# Patient Record
Sex: FEMALE | Race: WHITE | NOT HISPANIC OR LATINO | Employment: FULL TIME | URBAN - NONMETROPOLITAN AREA
[De-identification: names, ages, dates, MRNs, and addresses within clinical notes are randomized per-mention and may not be internally consistent; named-entity substitution may affect disease eponyms.]

---

## 2023-11-10 ENCOUNTER — OFFICE VISIT (OUTPATIENT)
Dept: PRIMARY CARE | Facility: CLINIC | Age: 58
End: 2023-11-10
Payer: COMMERCIAL

## 2023-11-10 ENCOUNTER — HOSPITAL ENCOUNTER (OUTPATIENT)
Dept: RADIOLOGY | Facility: HOSPITAL | Age: 58
Discharge: HOME | End: 2023-11-10
Payer: COMMERCIAL

## 2023-11-10 VITALS
SYSTOLIC BLOOD PRESSURE: 118 MMHG | HEART RATE: 72 BPM | OXYGEN SATURATION: 98 % | DIASTOLIC BLOOD PRESSURE: 78 MMHG | BODY MASS INDEX: 32.28 KG/M2 | WEIGHT: 159.8 LBS

## 2023-11-10 DIAGNOSIS — M25.561 ACUTE PAIN OF RIGHT KNEE: Primary | ICD-10-CM

## 2023-11-10 DIAGNOSIS — S86.911A KNEE STRAIN, RIGHT, INITIAL ENCOUNTER: ICD-10-CM

## 2023-11-10 DIAGNOSIS — M25.561 ACUTE PAIN OF RIGHT KNEE: ICD-10-CM

## 2023-11-10 PROBLEM — N90.89 VULVAR LESION: Status: ACTIVE | Noted: 2023-11-10

## 2023-11-10 PROBLEM — Z04.9 CONDITION NOT FOUND: Status: ACTIVE | Noted: 2023-11-10

## 2023-11-10 PROCEDURE — 73562 X-RAY EXAM OF KNEE 3: CPT | Mod: RT

## 2023-11-10 PROCEDURE — 2500000004 HC RX 250 GENERAL PHARMACY W/ HCPCS (ALT 636 FOR OP/ED): Performed by: FAMILY MEDICINE

## 2023-11-10 PROCEDURE — 73562 X-RAY EXAM OF KNEE 3: CPT | Mod: RIGHT SIDE | Performed by: RADIOLOGY

## 2023-11-10 PROCEDURE — 99213 OFFICE O/P EST LOW 20 MIN: CPT | Performed by: FAMILY MEDICINE

## 2023-11-10 PROCEDURE — 96372 THER/PROPH/DIAG INJ SC/IM: CPT | Performed by: FAMILY MEDICINE

## 2023-11-10 PROCEDURE — 1036F TOBACCO NON-USER: CPT | Performed by: FAMILY MEDICINE

## 2023-11-10 RX ORDER — METHYLPREDNISOLONE 4 MG/1
TABLET ORAL
Qty: 1 EACH | Refills: 0 | Status: SHIPPED | OUTPATIENT
Start: 2023-11-11 | End: 2024-01-23 | Stop reason: WASHOUT

## 2023-11-10 RX ORDER — METHYLPREDNISOLONE ACETATE 80 MG/ML
80 INJECTION, SUSPENSION INTRA-ARTICULAR; INTRALESIONAL; INTRAMUSCULAR; SOFT TISSUE ONCE
Status: COMPLETED | OUTPATIENT
Start: 2023-11-10 | End: 2023-11-10

## 2023-11-10 RX ADMIN — METHYLPREDNISOLONE ACETATE 80 MG: 80 INJECTION, SUSPENSION INTRA-ARTICULAR; INTRALESIONAL; INTRAMUSCULAR; SOFT TISSUE at 15:55

## 2023-11-10 ASSESSMENT — ENCOUNTER SYMPTOMS
ARTHRALGIAS: 1
WEAKNESS: 0
JOINT SWELLING: 1

## 2023-11-10 ASSESSMENT — PATIENT HEALTH QUESTIONNAIRE - PHQ9
2. FEELING DOWN, DEPRESSED OR HOPELESS: NOT AT ALL
SUM OF ALL RESPONSES TO PHQ9 QUESTIONS 1 AND 2: 0
1. LITTLE INTEREST OR PLEASURE IN DOING THINGS: NOT AT ALL

## 2023-11-10 ASSESSMENT — PAIN SCALES - GENERAL: PAINLEVEL: 8

## 2023-11-10 NOTE — PROGRESS NOTES
Subjective   Patient ID: Risa Alvarenga is a 58 y.o. female who presents for right leg pain.    HPI pt c.o of pain behind right knee.  She has been doing alot of painting and climbing up and down the ladder and noticed a little discomfort at the knee end of August/Sept.  Since then it has worsened and she thinks it may look swollen.  It is localized to back and lateral part of right knee.  Using aspercream which helps. Pain is worse at end of day after standing all day.  It hurts at night in bed.  Pain is relieved by nothing.    Review of Systems   Musculoskeletal:  Positive for arthralgias, gait problem and joint swelling.   Neurological:  Negative for weakness.       Objective   /78   Pulse 72   Wt 72.5 kg (159 lb 12.8 oz)   SpO2 98%   BMI 32.28 kg/m²     Physical Exam  Musculoskeletal:      Right knee: Ecchymosis present. No swelling, effusion, erythema or crepitus. Tenderness present over the lateral joint line and LCL. No LCL laxity, MCL laxity, ACL laxity or PCL laxity. Normal meniscus.      Instability Tests: Anterior drawer test negative. Anterior Lachman test negative. Medial Carlos test negative and lateral Carlos test negative.         Assessment/Plan   Problem List Items Addressed This Visit    None  Visit Diagnoses         Codes    Acute pain of right knee    -  Primary M25.561    Relevant Medications    methylPREDNISolone (Medrol Dospak) 4 mg tablets (Start on 11/11/2023)    methylPREDNISolone acetate (DEPO-Medrol) injection 80 mg (Completed) (Start on 11/10/2023  4:00 PM)    Other Relevant Orders    XR knee right 1-2 views    Knee strain, right, initial encounter     S86.911A    rest, ice, anti inflammatories

## 2023-12-28 ENCOUNTER — OFFICE VISIT (OUTPATIENT)
Dept: OBSTETRICS AND GYNECOLOGY | Facility: CLINIC | Age: 58
End: 2023-12-28
Payer: COMMERCIAL

## 2023-12-28 VITALS
DIASTOLIC BLOOD PRESSURE: 68 MMHG | HEIGHT: 60 IN | SYSTOLIC BLOOD PRESSURE: 108 MMHG | WEIGHT: 158 LBS | BODY MASS INDEX: 31.02 KG/M2

## 2023-12-28 DIAGNOSIS — Z12.4 CERVICAL CANCER SCREENING: ICD-10-CM

## 2023-12-28 DIAGNOSIS — Z01.419 WELL WOMAN EXAM WITH ROUTINE GYNECOLOGICAL EXAM: Primary | ICD-10-CM

## 2023-12-28 DIAGNOSIS — Z12.31 VISIT FOR SCREENING MAMMOGRAM: ICD-10-CM

## 2023-12-28 PROCEDURE — 99396 PREV VISIT EST AGE 40-64: CPT | Performed by: NURSE PRACTITIONER

## 2023-12-28 PROCEDURE — 87624 HPV HI-RISK TYP POOLED RSLT: CPT

## 2023-12-28 PROCEDURE — 88175 CYTOPATH C/V AUTO FLUID REDO: CPT

## 2023-12-28 PROCEDURE — 1036F TOBACCO NON-USER: CPT | Performed by: NURSE PRACTITIONER

## 2023-12-28 PROCEDURE — 88141 CYTOPATH C/V INTERPRET: CPT | Performed by: PATHOLOGY

## 2023-12-28 ASSESSMENT — ENCOUNTER SYMPTOMS
NEUROLOGICAL NEGATIVE: 1
ENDOCRINE NEGATIVE: 1
MUSCULOSKELETAL NEGATIVE: 1
ALLERGIC/IMMUNOLOGIC NEGATIVE: 1
EYES NEGATIVE: 1
ABDOMINAL PAIN: 1
PSYCHIATRIC NEGATIVE: 1
HEMATOLOGIC/LYMPHATIC NEGATIVE: 1
RESPIRATORY NEGATIVE: 1
CONSTITUTIONAL NEGATIVE: 1
CARDIOVASCULAR NEGATIVE: 1

## 2023-12-28 NOTE — PROGRESS NOTES
"Chief Complaint    Annual Exam        HPI    ANNUAL - WAKES UP CRAMPY AND STOMACH PAIN    PAP - 10/23/2020 NEG / HPV NEG  MAMMO - 2022  DEXA - NEVER  COLON -  OR  NORMAL  Last edited by Corie Mahmood MA on 2023  9:43 AM.         58 y.o.  female presents for annual well woman exam.   Hx. of endometrial ablation in ~.  Denies menopausal symptoms. She is not on hormone replacement therapy.  Denies any post-menopausal vaginal bleeding.  She is , currently sexually active. Denies dyspareunia.    She denies any breast changes. H/O benign fatty lymph nodes, right axilla.  Pap hx. normal. Last pap: 10/2020 and was negative and negative HPV.   Denies abnormal vaginal discharge, itching, odor, or dryness.   No urinary or bowel concerns. H/O bladder suspension ~20 years ago. Reports abdominal pain, near umbilicus and upset stomach. Occurs in the morning then gets better throughout the day. Colonoscopy: 2019 which was ~2019 and is due to return at 5 years.   She is a non-smoker.   PMH: None  Family h/o breast cancer: Mother  Family h/o GYN cancer: None  Family h/o colon cancer: None  She has 2 sons. First grandbaby born this year. Works as manager in cafeteria at [employer]    /68   Ht 1.518 m (4' 11.75\")   Wt 71.7 kg (158 lb)   BMI 31.12 kg/m²      Review of Systems   Constitutional: Negative.    HENT: Negative.     Eyes: Negative.    Respiratory: Negative.     Cardiovascular: Negative.    Gastrointestinal:  Positive for abdominal pain.   Endocrine: Negative.    Genitourinary: Negative.    Musculoskeletal: Negative.    Skin: Negative.    Allergic/Immunologic: Negative.    Neurological: Negative.    Hematological: Negative.    Psychiatric/Behavioral: Negative.     All other systems reviewed and are negative.       Physical Exam  Constitutional:       Appearance: Normal appearance.   HENT:      Head: Normocephalic.      Nose: Nose normal.   Cardiovascular:      Rate and Rhythm: " Normal rate and regular rhythm.   Pulmonary:      Effort: Pulmonary effort is normal.      Breath sounds: Normal breath sounds.   Chest:   Breasts:     Right: Normal.      Left: Normal.   Abdominal:      General: Abdomen is flat.      Palpations: Abdomen is soft.   Genitourinary:     General: Normal vulva.      Vagina: Normal.      Cervix: Normal.      Uterus: Normal.       Adnexa: Right adnexa normal and left adnexa normal.      Rectum: Normal.      Comments: Pap collected today  Musculoskeletal:         General: Normal range of motion.      Cervical back: Normal range of motion and neck supple.   Skin:     General: Skin is warm and dry.   Neurological:      Mental Status: She is alert.   Psychiatric:         Mood and Affect: Mood normal.         Behavior: Behavior normal.     1. Well woman exam with routine gynecological exam  -Pap test w/HPV testing collected today.   -Mammogram ordered. Self breast awareness exams reviewed.  -Colonoscopy is up to date. Advised if abdominal pain continues, worsens to follow up with PCP.   -Advised yearly well woman exams.   -Follow up sooner if needed.     2. Visit for screening mammogram  - BI mammo bilateral screening tomosynthesis; Future    3. Cervical cancer screening  - THINPREP PAP

## 2024-01-22 ENCOUNTER — TELEPHONE (OUTPATIENT)
Dept: OBSTETRICS AND GYNECOLOGY | Facility: CLINIC | Age: 59
End: 2024-01-22
Payer: COMMERCIAL

## 2024-01-23 ENCOUNTER — OFFICE VISIT (OUTPATIENT)
Dept: PRIMARY CARE | Facility: CLINIC | Age: 59
End: 2024-01-23
Payer: COMMERCIAL

## 2024-01-23 ENCOUNTER — TELEPHONE (OUTPATIENT)
Dept: PRIMARY CARE | Facility: CLINIC | Age: 59
End: 2024-01-23
Payer: COMMERCIAL

## 2024-01-23 VITALS
BODY MASS INDEX: 31.79 KG/M2 | SYSTOLIC BLOOD PRESSURE: 128 MMHG | DIASTOLIC BLOOD PRESSURE: 84 MMHG | HEART RATE: 72 BPM | WEIGHT: 161.4 LBS

## 2024-01-23 DIAGNOSIS — H10.33 ACUTE CONJUNCTIVITIS OF BOTH EYES, UNSPECIFIED ACUTE CONJUNCTIVITIS TYPE: Primary | ICD-10-CM

## 2024-01-23 PROCEDURE — 1036F TOBACCO NON-USER: CPT | Performed by: FAMILY MEDICINE

## 2024-01-23 PROCEDURE — 99213 OFFICE O/P EST LOW 20 MIN: CPT | Performed by: FAMILY MEDICINE

## 2024-01-23 RX ORDER — TOBRAMYCIN 3 MG/ML
1 SOLUTION/ DROPS OPHTHALMIC 3 TIMES DAILY
Qty: 5 ML | Refills: 0 | Status: SHIPPED | OUTPATIENT
Start: 2024-01-23

## 2024-01-23 RX ORDER — GENTAMICIN SULFATE 3 MG/ML
SOLUTION/ DROPS OPHTHALMIC
COMMUNITY
Start: 2024-01-19 | End: 2024-01-23 | Stop reason: ALTCHOICE

## 2024-01-23 ASSESSMENT — PAIN SCALES - GENERAL: PAINLEVEL: 0-NO PAIN

## 2024-01-23 ASSESSMENT — PATIENT HEALTH QUESTIONNAIRE - PHQ9
1. LITTLE INTEREST OR PLEASURE IN DOING THINGS: NOT AT ALL
2. FEELING DOWN, DEPRESSED OR HOPELESS: NOT AT ALL
SUM OF ALL RESPONSES TO PHQ9 QUESTIONS 1 AND 2: 0

## 2024-01-23 NOTE — TELEPHONE ENCOUNTER
Pt called. Questions answered.   Pap showed ASCUS and was negative for HPV. Discussed this is benign.   Umbilical pain is unrelated to this finding on the pap, patient advised to follow up with PCP regarding abdominal pain.

## 2024-01-23 NOTE — PROGRESS NOTES
Subjective   Patient ID: Risa Alvarenga is a 58 y.o. female who presents for eyes itchy.    HPI   Pt comes in with bilateral red eyes with itching and discharge. Started on 1/16/24. She was seen in UC on the 1/16/24 for this and dx'd with conjunctivitis and put on gentamicin eye drops but didn't start until Friday. She says her eyes were getting better but today they had more yellow discharge so she got concerned.    Past Medical History:   Diagnosis Date    Personal history of other infectious and parasitic diseases     History of chicken pox    Plantar fascial fibromatosis     Plantar fasciitis    Pure hypercholesterolemia, unspecified     High cholesterol    Unspecified injury of unspecified foot, initial encounter     Injury of foot, unspecified laterality, initial encounter     Current Outpatient Medications   Medication Sig Dispense Refill    folic acid/multivit,iron, (ONE DAILY FOR WOMEN ORAL) Orally      tobramycin (Tobrex) 0.3 % ophthalmic solution Administer 1 drop into both eyes 3 times a day. For 7 days. 5 mL 0     No current facility-administered medications for this visit.       Review of Systems see hpi    Objective   /84   Pulse 72   Wt 73.2 kg (161 lb 6.4 oz)   BMI 31.79 kg/m²     Physical Exam  Vitals reviewed.   Constitutional:       Appearance: Normal appearance.   HENT:      Head: Normocephalic and atraumatic.   Eyes:      Extraocular Movements: Extraocular movements intact.      Pupils: Pupils are equal, round, and reactive to light.      Comments: Both eyes slightly injected; no discharge noted at this time but pt says they had yellow crusting this AM which was worse than before.   Neurological:      Mental Status: She is alert.         Assessment/Plan   Problem List Items Addressed This Visit    None  Visit Diagnoses         Codes    Acute conjunctivitis of both eyes, unspecified acute conjunctivitis type    -  Primary H10.33    Relevant Medications    tobramycin (Tobrex) 0.3 %  ophthalmic solution          Patient counseled regarding potential side-effects of new medication; there is no barrier to adherence identified and pt expressed understanding

## 2024-01-23 NOTE — TELEPHONE ENCOUNTER
Last OV Gentamicin 0.3% 2-3 drops per eye every 4 hours and every night at bedtime for 5 days- went to [facility] on 01/16/24 and she states that he eyes are worse-red, goopy, more itchy with using the drops, eye looks a little yellowish. She thinks maybe she is having a reaction-patient was sent to the front to schedule an appt

## 2024-02-06 ENCOUNTER — TELEPHONE (OUTPATIENT)
Dept: PRIMARY CARE | Facility: CLINIC | Age: 59
End: 2024-02-06
Payer: COMMERCIAL

## 2024-02-06 NOTE — TELEPHONE ENCOUNTER
Pt has sores on lips, bottom lip is tingling and burning, chest is on fire, nose is burning. Sx started yesterday morning.   Pt is covid neg.  Pt is not taking anything.

## 2024-02-06 NOTE — TELEPHONE ENCOUNTER
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 She did say in the past she was given a med for these blisters from dermatology- [facility]

## 2024-02-07 DIAGNOSIS — B97.89 VIRAL STOMATITIS: Primary | ICD-10-CM

## 2024-02-07 DIAGNOSIS — K12.1 VIRAL STOMATITIS: Primary | ICD-10-CM

## 2024-02-07 RX ORDER — LIDOCAINE HYDROCHLORIDE 20 MG/ML
SOLUTION OROPHARYNGEAL
Qty: 100 ML | Refills: 0 | Status: SHIPPED | OUTPATIENT
Start: 2024-02-07

## 2024-02-14 ENCOUNTER — HOSPITAL ENCOUNTER (OUTPATIENT)
Dept: RADIOLOGY | Facility: HOSPITAL | Age: 59
Discharge: HOME | End: 2024-02-14
Payer: COMMERCIAL

## 2024-02-14 DIAGNOSIS — G89.29 OTHER CHRONIC PAIN: ICD-10-CM

## 2024-02-14 DIAGNOSIS — M79.672 PAIN IN LEFT FOOT: ICD-10-CM

## 2024-02-14 DIAGNOSIS — M25.572 PAIN IN LEFT ANKLE AND JOINTS OF LEFT FOOT: ICD-10-CM

## 2024-02-14 PROCEDURE — 73718 MRI LOWER EXTREMITY W/O DYE: CPT | Mod: LT

## 2024-02-14 PROCEDURE — 73718 MRI LOWER EXTREMITY W/O DYE: CPT | Mod: LEFT SIDE | Performed by: STUDENT IN AN ORGANIZED HEALTH CARE EDUCATION/TRAINING PROGRAM

## 2024-02-14 PROCEDURE — 73721 MRI JNT OF LWR EXTRE W/O DYE: CPT | Mod: LT

## 2024-02-14 PROCEDURE — 73721 MRI JNT OF LWR EXTRE W/O DYE: CPT | Mod: LEFT SIDE | Performed by: STUDENT IN AN ORGANIZED HEALTH CARE EDUCATION/TRAINING PROGRAM

## 2024-08-03 NOTE — PROGRESS NOTES
Pre-Op Evaluation.  HPI:     General Comments:          Surgical procedure  LOW risk-cataract, endoscopy,breast, superficial):         History of Illness:. She's had chronic pain on right foot.  Plan is to do a weil osteotomy, left 2nd MT soft tissue reconstruction of angular toe deformity, possible neuroma excision of the left foot.  Surgery to take place 24 at [facility] by Dr. Theo Aaron of [facility].    MEDICAL HISTORY:   Past Medical History:   Diagnosis Date    Personal history of other infectious and parasitic diseases     History of chicken pox    Plantar fascial fibromatosis     Plantar fasciitis    Unspecified injury of unspecified foot, initial encounter     Injury of foot, unspecified laterality, initial encounter     MEDICATIONS:   Current Outpatient Medications   Medication Sig Dispense Refill    folic acid/multivit,iron, (ONE DAILY FOR WOMEN ORAL) Orally      valACYclovir (Valtrex) 1 gram tablet Take 2 tablets (2,000 mg) by mouth 2 times a day for 1 day. Take 2 tabs (2000 mg) 2 times a days for 1 day. 4 tablet 2     No current facility-administered medications for this visit.     ALLERGIES:   Allergies   Allergen Reactions    Penicillins Hives, Other and Rash    Sulfa (Sulfonamide Antibiotics) Rash and Hives     SURGICAL HISTORY:   Past Surgical History:   Procedure Laterality Date    BLADDER SURGERY  2004    CARDIAC SURGERY  2016    Heart Surgery     SECTION, CLASSIC       and     COLONOSCOPY  2014    sai 5 yrs  [facility] + polyp    ENDOMETRIAL ABLATION  2010    FOOT SURGERY Right 2016    [facility] for plantar fasciitis    HERNIA REPAIR  2020    [facility]; incarcerated    HYSTEROSCOPY  2015    Hysteroscopy With Endometrial Ablation    TUBAL LIGATION  1998    WISDOM TOOTH EXTRACTION       FAMILY HISTORY:   Family History   Problem Relation Name Age of Onset    Cancer Mother          metastatic; no known primary    Diabetes  Father       SOCIAL HISTORY:   Social History     Tobacco Use    Smoking status: Never     Passive exposure: Past    Smokeless tobacco: Never   Substance Use Topics    Alcohol use: Yes     Comment: SOCIALLY    Drug use: Never                 ROS:        CONSTITUTION:  alert, oriented, well developed, in no acute distress     OPHTHALMOLOGY:          Blurring of vision no.  no Vision loss.  no Eye redness.  no Double vision.         CARDIOLOGY:          no Change in exercise tolerance.  no Chest pain.  no Leg edema.  no Palpitations.         RESPIRATORY:          no Shortness of breath.  no Wheezing.  no Trouble breathing.         GASTROENTEROLOGY:          no Abdominal pain.  no Constipation.  no Diarrhea.  no Nausea.  no Vomiting.         UROLOGY:          no Blood in urine.  no Urinary incontinence.  no Urinary frequency.  no Burning on urination.         DERMATOLOGY:          no Rash.  no Jaundice.         NEUROLOGY:          no Dizziness.  no Headache.             VITAL SIGNS: /68   Pulse 83   Wt 72.7 kg (160 lb 3.2 oz)   SpO2 98%   BMI 31.55 kg/m²     EXAMINATION:       General Examination:         GENERAL APPEARANCE: NAD, pleasant.          HEENT: PERRLA, EOMI, tympanic membranes normal, pharynx and tonsils normal.          NECK: supple, no lymphadenopathy, no thyromegaly.          HEART: RSR/RRR, no murmurs, click or rubs.          LUNGS: clear to auscultation, no wheezes/rhonchi/rales.          ABDOMEN: soft, NT/ND, BS present, no hepatosplenomegaly, no CVA tenderness.          EXTREMITIES: no clubbing, no edema, no cyanosis; strength normal in all extremities bilaterally.          SKIN: normal, no rash. Small cold sore on lower lip left side; started y'day         NEUROLOGIC EXAM: no focal signs, awake & alert, oriented x 3, CN's II-XII grossly intact, reflexes normal.      Risa was seen today for surgery clearance .  Diagnoses and all orders for this visit:  Preoperative clearance  (Primary)  Comments:  revised cardiac index Class I 3.9% risk  30 day risk of death, MI or cardiac arrest.  PT IS CLEARED FOR SURGERY  Injury of left foot, initial encounter  Toe contracture, left  Left foot pain  Right foot pain  Cold sore  -     valACYclovir (Valtrex) 1 gram tablet; Take 2 tablets (2,000 mg) by mouth 2 times a day for 1 day. Take 2 tabs (2000 mg) 2 times a days for 1 day.

## 2024-08-13 ENCOUNTER — OFFICE VISIT (OUTPATIENT)
Dept: PRIMARY CARE | Facility: CLINIC | Age: 59
End: 2024-08-13
Payer: COMMERCIAL

## 2024-08-13 VITALS
HEART RATE: 83 BPM | OXYGEN SATURATION: 98 % | WEIGHT: 160.2 LBS | DIASTOLIC BLOOD PRESSURE: 68 MMHG | BODY MASS INDEX: 31.55 KG/M2 | SYSTOLIC BLOOD PRESSURE: 122 MMHG

## 2024-08-13 DIAGNOSIS — B00.1 COLD SORE: ICD-10-CM

## 2024-08-13 DIAGNOSIS — Z01.818 PREOPERATIVE CLEARANCE: Primary | ICD-10-CM

## 2024-08-13 DIAGNOSIS — M79.671 RIGHT FOOT PAIN: ICD-10-CM

## 2024-08-13 DIAGNOSIS — S99.922A INJURY OF LEFT FOOT, INITIAL ENCOUNTER: ICD-10-CM

## 2024-08-13 DIAGNOSIS — M79.672 LEFT FOOT PAIN: ICD-10-CM

## 2024-08-13 DIAGNOSIS — M20.5X2 TOE CONTRACTURE, LEFT: ICD-10-CM

## 2024-08-13 PROCEDURE — 1036F TOBACCO NON-USER: CPT | Performed by: FAMILY MEDICINE

## 2024-08-13 PROCEDURE — 99214 OFFICE O/P EST MOD 30 MIN: CPT | Performed by: FAMILY MEDICINE

## 2024-08-13 RX ORDER — VALACYCLOVIR HYDROCHLORIDE 1 G/1
2000 TABLET, FILM COATED ORAL 2 TIMES DAILY
Qty: 4 TABLET | Refills: 2 | Status: SHIPPED | OUTPATIENT
Start: 2024-08-13 | End: 2024-08-14

## 2024-08-13 ASSESSMENT — PAIN SCALES - GENERAL: PAINLEVEL: 0-NO PAIN

## 2024-08-30 ENCOUNTER — HOSPITAL ENCOUNTER (OUTPATIENT)
Dept: RADIOLOGY | Facility: HOSPITAL | Age: 59
Discharge: HOME | End: 2024-08-30
Payer: COMMERCIAL

## 2024-08-30 DIAGNOSIS — M79.662 PAIN IN LEFT LOWER LEG: ICD-10-CM

## 2024-08-30 PROCEDURE — 93971 EXTREMITY STUDY: CPT

## 2024-10-01 NOTE — PROGRESS NOTES
Subjective   Patient ID: Risa Alvarenga is a 59 y.o. female who presents for Colonoscopy Consult.  HPI  Patient returns to clinic for Colonoscopy Consult.  Patient last seen in the office on 2020 for S/P Open Repair of Incarcerated Umbilical Hernia with Mesh on 2020.  Patient's last Colonoscopy performed on 2019, recommended repeat Colonoscopy in 5 years d/t personal history of Polyps.  Patient having normal daily bowel movements without diarrhea or constipation.  No blood in the stool.  Patient's weight has been stable.  No abdominal pain.  No new cancer diagnosis in the family.    Social History:  Tobacco Use - NEVER  Do you use any recreational drugs - NEVER  Alcohol Use - SOCIALLY   Caffeine Intake - NO  Working - FULL TIME   Marital status -    Living with - SPOUSE     Past Medical History:   Diagnosis Date    Personal history of other infectious and parasitic diseases     History of chicken pox    Plantar fascial fibromatosis     Plantar fasciitis    Unspecified injury of unspecified foot, initial encounter     Injury of foot, unspecified laterality, initial encounter     Past Surgical History:   Procedure Laterality Date    BLADDER SURGERY      CARDIAC SURGERY  2016    Heart Surgery     SECTION, CLASSIC       and     COLONOSCOPY  2014    sai 5 yrs  [facility]ie + polyp    ENDOMETRIAL ABLATION  2010    FOOT SURGERY Right 2016    [facility] for plantar fasciitis    HERNIA REPAIR      [facility]; incarcerated    HYSTEROSCOPY  2015    Hysteroscopy With Endometrial Ablation    OSTEOTOMY Left 2024    FOOT- [facility]    TUBAL LIGATION  1998    WISDOM TOOTH EXTRACTION       Family History   Problem Relation Name Age of Onset    Cancer Mother          metastatic; no known primary    Diabetes Father       Allergies   Allergen Reactions    Penicillins Hives, Other and Rash     Other Reaction(s): Other    Sulfa (Sulfonamide Antibiotics) Hives and Rash       Review  "of Systems  /86   Pulse 74   Temp 36.4 °C (97.6 °F)   Resp 17   Ht 1.499 m (4' 11\")   Wt 72.6 kg (160 lb)   SpO2 96%   BMI 32.32 kg/m²     Objective   Physical Exam  /86   Pulse 74   Temp 36.4 °C (97.6 °F)   Resp 17   Ht 1.499 m (4' 11\")   Wt 72.6 kg (160 lb)   SpO2 96%   BMI 32.32 kg/m²    GENERAL APPEARANCE: Patient appears in no acute distress.   EYES: Sclera non-icteric, PERRLA.   ENT Normal appearance of ears and nose.   NECK/THYROID: Neck: no masses. Thyroid: no masses.   LYMPH NODES: No cervical or supraclavicular lymphadenopathy.   CARDIOVASCULAR Heart: RRR, no murmurs; Carotid bruits: none; Peripheral edema: none.   RESPIRATORY: Lungs: clear to auscultation bilaterally; no respiratory distress.   GI (ABDOMEN) No intraabdominal mass appreciated, no hepatosplenomegaly; Hernia: none umbilical hernia repair intact. ; Tenderness: none.   PSYCH: Patient oriented to time, place and person, normal affect.    Assessment/Plan   Problem List Items Addressed This Visit             ICD-10-CM    Personal history of colon polyps, unspecified - Primary Z86.0100     Patient due for colonoscopy.  Recommend repeat colonoscopy.  Risk-benefit alternatives of doing the procedure were thoroughly discussed with the patient agrees to proceed.  This includes a 0.02% risk of perforating the colon with polypectomies.  The RN will go over the bowel prep when scheduling.                  Velvet Ramsey MA 10/07/24 1:50 PM   "

## 2024-10-05 PROBLEM — Z86.0100 PERSONAL HISTORY OF COLON POLYPS, UNSPECIFIED: Status: ACTIVE | Noted: 2024-10-05

## 2024-10-05 NOTE — ASSESSMENT & PLAN NOTE
Patient due for colonoscopy.  Recommend repeat colonoscopy.  Risk-benefit alternatives of doing the procedure were thoroughly discussed with the patient agrees to proceed.  This includes a 0.02% risk of perforating the colon with polypectomies.  The RN will go over the bowel prep when scheduling.

## 2024-10-07 ENCOUNTER — OFFICE VISIT (OUTPATIENT)
Dept: SURGERY | Facility: CLINIC | Age: 59
End: 2024-10-07
Payer: COMMERCIAL

## 2024-10-07 VITALS
OXYGEN SATURATION: 96 % | DIASTOLIC BLOOD PRESSURE: 86 MMHG | RESPIRATION RATE: 17 BRPM | BODY MASS INDEX: 32.25 KG/M2 | TEMPERATURE: 97.6 F | SYSTOLIC BLOOD PRESSURE: 134 MMHG | HEART RATE: 74 BPM | WEIGHT: 160 LBS | HEIGHT: 59 IN

## 2024-10-07 DIAGNOSIS — Z86.0100 PERSONAL HISTORY OF COLON POLYPS, UNSPECIFIED: Primary | ICD-10-CM

## 2024-10-07 PROCEDURE — 99204 OFFICE O/P NEW MOD 45 MIN: CPT | Performed by: SURGERY

## 2024-10-07 PROCEDURE — 3008F BODY MASS INDEX DOCD: CPT | Performed by: SURGERY

## 2024-10-07 PROCEDURE — 99214 OFFICE O/P EST MOD 30 MIN: CPT | Performed by: SURGERY

## 2024-10-07 PROCEDURE — 1036F TOBACCO NON-USER: CPT | Performed by: SURGERY

## 2024-10-07 ASSESSMENT — PATIENT HEALTH QUESTIONNAIRE - PHQ9
SUM OF ALL RESPONSES TO PHQ9 QUESTIONS 1 & 2: 0
2. FEELING DOWN, DEPRESSED OR HOPELESS: NOT AT ALL
1. LITTLE INTEREST OR PLEASURE IN DOING THINGS: NOT AT ALL

## 2024-10-07 ASSESSMENT — PAIN SCALES - GENERAL: PAINLEVEL: 6

## 2024-10-07 ASSESSMENT — COLUMBIA-SUICIDE SEVERITY RATING SCALE - C-SSRS
1. IN THE PAST MONTH, HAVE YOU WISHED YOU WERE DEAD OR WISHED YOU COULD GO TO SLEEP AND NOT WAKE UP?: NO
6. HAVE YOU EVER DONE ANYTHING, STARTED TO DO ANYTHING, OR PREPARED TO DO ANYTHING TO END YOUR LIFE?: NO
2. HAVE YOU ACTUALLY HAD ANY THOUGHTS OF KILLING YOURSELF?: NO

## 2024-10-07 ASSESSMENT — LIFESTYLE VARIABLES
HOW OFTEN DO YOU HAVE A DRINK CONTAINING ALCOHOL: 2-4 TIMES A MONTH
HOW MANY STANDARD DRINKS CONTAINING ALCOHOL DO YOU HAVE ON A TYPICAL DAY: 1 OR 2
SKIP TO QUESTIONS 9-10: 1
HOW OFTEN DO YOU HAVE SIX OR MORE DRINKS ON ONE OCCASION: NEVER
AUDIT-C TOTAL SCORE: 2

## 2024-10-07 ASSESSMENT — ENCOUNTER SYMPTOMS
OCCASIONAL FEELINGS OF UNSTEADINESS: 0
DEPRESSION: 0
LOSS OF SENSATION IN FEET: 0

## 2024-10-15 ENCOUNTER — PRE-ADMISSION TESTING (OUTPATIENT)
Dept: PREADMISSION TESTING | Facility: HOSPITAL | Age: 59
End: 2024-10-15
Payer: COMMERCIAL

## 2024-10-15 VITALS
HEART RATE: 88 BPM | TEMPERATURE: 98.1 F | BODY MASS INDEX: 33.26 KG/M2 | DIASTOLIC BLOOD PRESSURE: 89 MMHG | OXYGEN SATURATION: 97 % | RESPIRATION RATE: 16 BRPM | WEIGHT: 165 LBS | HEIGHT: 59 IN | SYSTOLIC BLOOD PRESSURE: 126 MMHG

## 2024-10-15 PROCEDURE — 99204 OFFICE O/P NEW MOD 45 MIN: CPT | Performed by: NURSE PRACTITIONER

## 2024-10-15 ASSESSMENT — ENCOUNTER SYMPTOMS
RESPIRATORY NEGATIVE: 1
MUSCULOSKELETAL NEGATIVE: 1
GASTROINTESTINAL NEGATIVE: 1
CARDIOVASCULAR NEGATIVE: 1
EYES NEGATIVE: 1
PSYCHIATRIC NEGATIVE: 1
CONSTITUTIONAL NEGATIVE: 1
NEUROLOGICAL NEGATIVE: 1

## 2024-10-15 ASSESSMENT — DUKE ACTIVITY SCORE INDEX (DASI)
CAN YOU TAKE CARE OF YOURSELF (EAT, DRESS, BATHE, OR USE TOILET): YES
CAN YOU WALK A BLOCK OR TWO ON LEVEL GROUND: NO
CAN YOU CLIMB A FLIGHT OF STAIRS OR WALK UP A HILL: YES
CAN YOU DO YARD WORK LIKE RAKING LEAVES, WEEDING OR PUSHING A MOWER: YES
CAN YOU RUN A SHORT DISTANCE: NO
CAN YOU DO HEAVY WORK AROUND THE HOUSE LIKE SCRUBBING FLOORS OR LIFTING AND MOVING HEAVY FURNITURE: YES
TOTAL_SCORE: 39.95
CAN YOU WALK INDOORS, SUCH AS AROUND YOUR HOUSE: YES
CAN YOU HAVE SEXUAL RELATIONS: YES
DASI METS SCORE: 7.7
CAN YOU DO MODERATE WORK AROUND THE HOUSE LIKE VACUUMING, SWEEPING FLOORS OR CARRYING GROCERIES: YES
CAN YOU PARTICIPATE IN MODERATE RECREATIONAL ACTIVITIES LIKE GOLF, BOWLING, DANCING, DOUBLES TENNIS OR THROWING A BASEBALL OR FOOTBALL: YES
CAN YOU PARTICIPATE IN STRENOUS SPORTS LIKE SWIMMING, SINGLES TENNIS, FOOTBALL, BASKETBALL, OR SKIING: NO
CAN YOU DO LIGHT WORK AROUND THE HOUSE LIKE DUSTING OR WASHING DISHES: YES

## 2024-10-15 NOTE — CPM/PAT H&P
CPM/PAT Evaluation       Name: Risa Alvarenga (Risa Alvarenga)  /Age: 1965/59 y.o.     In-Person       Chief Complaint: History of colon polyps    HPI  A 59-year-old female with history of colon polyps.  Recommended repeat colonoscopy every 5 years-last colonoscopy 2019.  Reports mother had cancer-unsure of type. Denies fever, chills, nausea, vomiting, or changes in bowel habits.  She is scheduled for colonoscopy.    Past Medical History:   Diagnosis Date    Personal history of other infectious and parasitic diseases     History of chicken pox    Plantar fascial fibromatosis     Plantar fasciitis    Unspecified injury of unspecified foot, initial encounter     Injury of foot, unspecified laterality, initial encounter       Past Surgical History:   Procedure Laterality Date    BLADDER SURGERY      CARDIAC SURGERY  2016    Heart Surgery     SECTION, CLASSIC       and     COLONOSCOPY      sai 5 yrs  [facility]ie + polyp    ENDOMETRIAL ABLATION  2010    FOOT SURGERY Right 2016    [facility] for plantar fasciitis    HERNIA REPAIR      [facility]; incarcerated    HYSTEROSCOPY  2015    Hysteroscopy With Endometrial Ablation    [facility] 2024    [facility]    TUBAL LIGATION  1998    WISDOM TOOTH EXTRACTION           Allergies   Allergen Reactions    Penicillins Hives, Other and Rash     Other Reaction(s): Other    Sulfa (Sulfonamide Antibiotics) Hives and Rash       Current Outpatient Medications   Medication Sig Dispense Refill    folic acid/multivit,iron, (ONE DAILY FOR WOMEN ORAL) if needed.       No current facility-administered medications for this visit.       Review of Systems   Constitutional: Negative.    HENT: Negative.     Eyes: Negative.    Respiratory: Negative.     Cardiovascular: Negative.    Gastrointestinal: Negative.    Genitourinary: Negative.    Musculoskeletal: Negative.         Recent left foot/toe surgery   Skin: Negative.   "  Neurological: Negative.    Psychiatric/Behavioral: Negative.          Physical Exam  Vitals reviewed.   HENT:      Head: Normocephalic and atraumatic.      Mouth/Throat:      Mouth: Mucous membranes are moist.   Eyes:      Pupils: Pupils are equal, round, and reactive to light.   Cardiovascular:      Rate and Rhythm: Normal rate and regular rhythm.   Pulmonary:      Effort: Pulmonary effort is normal.      Breath sounds: Normal breath sounds.   Abdominal:      Palpations: Abdomen is soft.   Musculoskeletal:      Cervical back: Normal range of motion.      Left lower leg: Edema (+1) present.      Comments: Left surgical shoe present   Skin:     General: Skin is warm and dry.   Neurological:      Mental Status: She is alert and oriented to person, place, and time.   Psychiatric:         Mood and Affect: Mood normal.          PAT AIRWAY:   Airway:     Mallampati::  II    Neck ROM::  Full  normal        /89   Pulse 88   Temp 36.7 °C (98.1 °F) (Temporal)   Resp 16   Ht 1.499 m (4' 11\")   Wt 74.8 kg (165 lb)   SpO2 97%   BMI 33.33 kg/m²       Stop Bang Score 1   CHADS: 1.9%  DASI risk score: 39.95  METS: 7.7  RCRI: 0.4%  ASA: II  ARISCAT:1.6%      Assessment and Plan:     History of colon polyps Plan: Colonoscopy.  Recent Left foot/toe surgery on 8/2/2024  BMI-33.33        "

## 2024-10-15 NOTE — PREPROCEDURE INSTRUCTIONS
PAT DISCHARGE INSTRUCTIONS    Please call the Same Day Surgery (SDS) Department of the hospital where your procedure will be performed after 2:00 PM the day before your surgery. If you are scheduled on a Monday, or a Tuesday following a Monday holiday, you will need to call on the last business day prior to your surgery.    [place] [id number] [place] [id number]  [phone]  Second Floor        Please let your surgeon know if:      You develop any open sores, shingles, burning or painful urination as these may increase your risk of an infection.   You no longer wish to have the surgery.   Any other personal circumstances change that may lead to the need to cancel or defer this surgery-such as being sick or getting admitted to any hospital within one week of your planned procedure.    Your contact details change, such as a change of address or phone number.    Starting now:     Please DO NOT drink alcohol or smoke for 24 hours before surgery. It is well known that quitting smoking can make a huge difference to your health and recovery from surgery. The longer you abstain from smoking, the better your chances of a healthy recovery. If you need help with quitting, call 1-800-QUIT-NOW to be connected to a trained counselor who will discuss the best methods to help you quit.     Before your surgery:    Please stop all supplements 7 days prior to surgery. Or as directed by your surgeon.   Please stop taking NSAID pain medicine such as Advil and Motrin 7 days before surgery.    If you develop any fever, cough, cold, rashes, cuts, scratches, scrapes, urinary symptoms or infection anywhere on your body (including teeth and gums) prior to surgery, please call your surgeon’s office as soon as possible. This may require treatment to reduce the chance of cancellation on the day of surgery.    The day before your surgery:   DIET- PLEASE FOLLOW DR SIDHU BOWEL PREP INSTRUCTIONS   Get  a good night’s rest.  Use the special soap for bathing if you have been instructed to use one.    Scheduled surgery times may change and you will be notified if this occurs - please check your personal voicemail for any updates.     On the morning of surgery:   Wear comfortable, loose fitting clothes which open in the front. Please do not wear moisturizers, creams, lotions, makeup or perfume.    Please bring with you to surgery:   Photo ID and insurance card   Current list of medicines and allergies   Pacemaker/ Defibrillator/Heart stent cards   CPAP machine and mask    Slings/ splints/ crutches   A copy of your complete advanced directive/DHPOA.    Please do NOT bring with you to surgery:   All jewelry and valuables should be left at home.   Prosthetic devices such as contact lenses, hearing aids, dentures, eyelash extensions, hairpins and body piercings must be removed prior to going in to the surgical suite.    After outpatient surgery:   A responsible adult MUST accompany you at the time of discharge and stay with you for 24 hours after your surgery. You may NOT drive yourself home after surgery.    Do not drive, operate machinery, make critical decisions or do activities that require co-ordination or balance until after a night’s sleep.   Do not drink alcoholic beverages for 24 hours.   Instructions for resuming your medications will be provided by your surgeon.    CALL YOUR DOCTOR AFTER SURGERY IF YOU HAVE:     Chills and/or a fever of 101° F or higher.    Redness, swelling, pus or drainage from your surgical wound or a bad smell from the wound.    Lightheadedness, fainting or confusion.    Persistent vomiting (throwing up) and are not able to eat or drink for 12 hours.    Three or more loose, watery bowel movements in 24 hours (diarrhea).   Difficulty or pain while urinating( after non-urological surgery)    Pain and swelling in your legs, especially if it is only on one side.    Difficulty breathing or are  breathing faster than normal.    Any new concerning symptoms.         Medication List            Accurate as of October 15, 2024 11:06 AM. Always use your most recent med list.                ONE DAILY FOR WOMEN ORAL  Additional Medication Adjustments for Surgery: Take last dose 7 days before surgery

## 2024-10-22 ENCOUNTER — ANESTHESIA EVENT (OUTPATIENT)
Dept: GASTROENTEROLOGY | Facility: HOSPITAL | Age: 59
End: 2024-10-22
Payer: COMMERCIAL

## 2024-10-22 ENCOUNTER — ANESTHESIA (OUTPATIENT)
Dept: GASTROENTEROLOGY | Facility: HOSPITAL | Age: 59
End: 2024-10-22
Payer: COMMERCIAL

## 2024-10-22 ENCOUNTER — HOSPITAL ENCOUNTER (OUTPATIENT)
Dept: GASTROENTEROLOGY | Facility: HOSPITAL | Age: 59
Discharge: HOME | End: 2024-10-22
Payer: COMMERCIAL

## 2024-10-22 VITALS
TEMPERATURE: 97.2 F | DIASTOLIC BLOOD PRESSURE: 68 MMHG | HEART RATE: 63 BPM | SYSTOLIC BLOOD PRESSURE: 120 MMHG | OXYGEN SATURATION: 99 % | RESPIRATION RATE: 20 BRPM | BODY MASS INDEX: 33.26 KG/M2 | HEIGHT: 59 IN | WEIGHT: 165 LBS

## 2024-10-22 DIAGNOSIS — Z86.0100 PERSONAL HISTORY OF COLON POLYPS, UNSPECIFIED: ICD-10-CM

## 2024-10-22 PROCEDURE — 45378 DIAGNOSTIC COLONOSCOPY: CPT | Performed by: SURGERY

## 2024-10-22 PROCEDURE — 3700000002 HC GENERAL ANESTHESIA TIME - EACH INCREMENTAL 1 MINUTE

## 2024-10-22 PROCEDURE — 7100000001 HC RECOVERY ROOM TIME - INITIAL BASE CHARGE

## 2024-10-22 PROCEDURE — 7100000010 HC PHASE TWO TIME - EACH INCREMENTAL 1 MINUTE

## 2024-10-22 PROCEDURE — 3700000001 HC GENERAL ANESTHESIA TIME - INITIAL BASE CHARGE

## 2024-10-22 PROCEDURE — 7100000002 HC RECOVERY ROOM TIME - EACH INCREMENTAL 1 MINUTE

## 2024-10-22 PROCEDURE — G0121 COLON CA SCRN NOT HI RSK IND: HCPCS | Performed by: SURGERY

## 2024-10-22 PROCEDURE — 7100000009 HC PHASE TWO TIME - INITIAL BASE CHARGE

## 2024-10-22 PROCEDURE — 2500000004 HC RX 250 GENERAL PHARMACY W/ HCPCS (ALT 636 FOR OP/ED): Performed by: ANESTHESIOLOGIST ASSISTANT

## 2024-10-22 RX ORDER — ONDANSETRON HYDROCHLORIDE 2 MG/ML
4 INJECTION, SOLUTION INTRAVENOUS ONCE AS NEEDED
Status: DISCONTINUED | OUTPATIENT
Start: 2024-10-22 | End: 2024-10-23 | Stop reason: HOSPADM

## 2024-10-22 RX ORDER — HYDROMORPHONE HYDROCHLORIDE 0.2 MG/ML
0.2 INJECTION INTRAMUSCULAR; INTRAVENOUS; SUBCUTANEOUS EVERY 5 MIN PRN
Status: DISCONTINUED | OUTPATIENT
Start: 2024-10-22 | End: 2024-10-23 | Stop reason: HOSPADM

## 2024-10-22 RX ORDER — MIDAZOLAM HYDROCHLORIDE 1 MG/ML
1 INJECTION, SOLUTION INTRAMUSCULAR; INTRAVENOUS ONCE AS NEEDED
Status: DISCONTINUED | OUTPATIENT
Start: 2024-10-22 | End: 2024-10-23 | Stop reason: HOSPADM

## 2024-10-22 RX ORDER — ALBUTEROL SULFATE 0.83 MG/ML
2.5 SOLUTION RESPIRATORY (INHALATION) ONCE
Status: DISCONTINUED | OUTPATIENT
Start: 2024-10-22 | End: 2024-10-23 | Stop reason: HOSPADM

## 2024-10-22 RX ORDER — ONDANSETRON HYDROCHLORIDE 2 MG/ML
INJECTION, SOLUTION INTRAVENOUS AS NEEDED
Status: DISCONTINUED | OUTPATIENT
Start: 2024-10-22 | End: 2024-10-22

## 2024-10-22 RX ORDER — KETAMINE HYDROCHLORIDE 50 MG/ML
INJECTION, SOLUTION INTRAMUSCULAR; INTRAVENOUS AS NEEDED
Status: DISCONTINUED | OUTPATIENT
Start: 2024-10-22 | End: 2024-10-22

## 2024-10-22 RX ORDER — SODIUM CHLORIDE, SODIUM LACTATE, POTASSIUM CHLORIDE, CALCIUM CHLORIDE 600; 310; 30; 20 MG/100ML; MG/100ML; MG/100ML; MG/100ML
INJECTION, SOLUTION INTRAVENOUS CONTINUOUS PRN
Status: DISCONTINUED | OUTPATIENT
Start: 2024-10-22 | End: 2024-10-22

## 2024-10-22 RX ORDER — SODIUM CHLORIDE, SODIUM LACTATE, POTASSIUM CHLORIDE, CALCIUM CHLORIDE 600; 310; 30; 20 MG/100ML; MG/100ML; MG/100ML; MG/100ML
100 INJECTION, SOLUTION INTRAVENOUS CONTINUOUS
Status: ACTIVE | OUTPATIENT
Start: 2024-10-22 | End: 2024-10-22

## 2024-10-22 RX ORDER — LIDOCAINE HYDROCHLORIDE 10 MG/ML
0.1 INJECTION, SOLUTION INFILTRATION; PERINEURAL ONCE
Status: DISCONTINUED | OUTPATIENT
Start: 2024-10-22 | End: 2024-10-23 | Stop reason: HOSPADM

## 2024-10-22 RX ORDER — MEPERIDINE HYDROCHLORIDE 25 MG/ML
12.5 INJECTION INTRAMUSCULAR; INTRAVENOUS; SUBCUTANEOUS EVERY 10 MIN PRN
Status: DISCONTINUED | OUTPATIENT
Start: 2024-10-22 | End: 2024-10-23 | Stop reason: HOSPADM

## 2024-10-22 RX ORDER — PROPOFOL 10 MG/ML
INJECTION, EMULSION INTRAVENOUS AS NEEDED
Status: DISCONTINUED | OUTPATIENT
Start: 2024-10-22 | End: 2024-10-22

## 2024-10-22 RX ORDER — MIDAZOLAM HYDROCHLORIDE 1 MG/ML
INJECTION, SOLUTION INTRAMUSCULAR; INTRAVENOUS AS NEEDED
Status: DISCONTINUED | OUTPATIENT
Start: 2024-10-22 | End: 2024-10-22

## 2024-10-22 RX ORDER — FENTANYL CITRATE 50 UG/ML
50 INJECTION, SOLUTION INTRAMUSCULAR; INTRAVENOUS EVERY 5 MIN PRN
Status: DISCONTINUED | OUTPATIENT
Start: 2024-10-22 | End: 2024-10-23 | Stop reason: HOSPADM

## 2024-10-22 ASSESSMENT — COLUMBIA-SUICIDE SEVERITY RATING SCALE - C-SSRS
6. HAVE YOU EVER DONE ANYTHING, STARTED TO DO ANYTHING, OR PREPARED TO DO ANYTHING TO END YOUR LIFE?: NO
1. IN THE PAST MONTH, HAVE YOU WISHED YOU WERE DEAD OR WISHED YOU COULD GO TO SLEEP AND NOT WAKE UP?: NO
2. HAVE YOU ACTUALLY HAD ANY THOUGHTS OF KILLING YOURSELF?: NO

## 2024-10-22 ASSESSMENT — PAIN - FUNCTIONAL ASSESSMENT
PAIN_FUNCTIONAL_ASSESSMENT: FLACC (FACE, LEGS, ACTIVITY, CRY, CONSOLABILITY)
PAIN_FUNCTIONAL_ASSESSMENT: 0-10

## 2024-10-22 ASSESSMENT — PAIN DESCRIPTION - DESCRIPTORS: DESCRIPTORS: CRAMPING

## 2024-10-22 ASSESSMENT — PAIN SCALES - GENERAL
PAINLEVEL_OUTOF10: 0 - NO PAIN
PAINLEVEL_OUTOF10: 0 - NO PAIN
PAINLEVEL_OUTOF10: 3
PAINLEVEL_OUTOF10: 0 - NO PAIN

## 2024-10-22 NOTE — ANESTHESIA PREPROCEDURE EVALUATION
Patient: Risa Alvarenga    Procedure Information       Date/Time: 10/22/24 0945    Scheduled providers: Megan August MD; Ramon Cunningham DO; RANJAN Mccabe    Procedure: COLONOSCOPY    Location: [facility]            Relevant Problems   Anesthesia (within normal limits)       Clinical information reviewed:   Tobacco  Allergies  Meds  Problems  Med Hx  Surg Hx  OB Status    Fam Hx  Soc Hx        NPO Detail:  NPO/Void Status  Carbohydrate Drink Given Prior to Surgery? : N  Date of Last Liquid: 10/22/24  Time of Last Liquid: 0200 (water)  Date of Last Solid: 10/20/24  Time of Last Solid: 1830  Last Intake Type: Clear fluids  Time of Last Void: 0809         Physical Exam    Airway  Mallampati: III  TM distance: >3 FB     Cardiovascular    Dental    Pulmonary    Abdominal            Anesthesia Plan    History of general anesthesia?: yes  History of complications of general anesthesia?: no    ASA 3     MAC     intravenous induction   Anesthetic plan and risks discussed with patient.

## 2024-10-22 NOTE — ANESTHESIA POSTPROCEDURE EVALUATION
Patient: Risa Alvarenga    Procedure Summary       Date: 10/22/24 Room / Location: [facility]    Anesthesia Start: 0950 Anesthesia Stop: 1023    Procedure: COLONOSCOPY Diagnosis: Personal history of colon polyps, unspecified    Scheduled Providers: Megan August MD; Ramon Cunningham DO; RANJAN Mccabe Responsible Provider: Ramon Cunningham DO    Anesthesia Type: MAC ASA Status: 3            Anesthesia Type: MAC    Vitals Value Taken Time   /75 10/22/24 1101   Temp 36.2 °C (97.2 °F) 10/22/24 1020   Pulse 67 10/22/24 1101   Resp 19 10/22/24 1101   SpO2 94 % 10/22/24 1101   Vitals shown include unfiled device data.    Anesthesia Post Evaluation    Patient location during evaluation: bedside  Patient participation: complete - patient participated  Level of consciousness: awake  Pain management: adequate  Airway patency: patent  Cardiovascular status: acceptable  Respiratory status: acceptable  Hydration status: acceptable  Postoperative Nausea and Vomiting: none        There were no known notable events for this encounter.

## 2024-10-22 NOTE — LETTER
October 22, 2024     Patient: Risa Alvarenga   YOB: 1965   Date of Visit: 10/22/2024       To Whom It May Concern:    Risa Alvarenga was seen in my clinic on 10/22/2024 at 9:45 am. Please excuse Golden for his absence from work on this day to be with his wife for her procedure.     If you have any questions or concerns, please don't hesitate to call.         Sincerely,         Megan August MD        CC: No Recipients

## 2024-10-25 ENCOUNTER — HOSPITAL ENCOUNTER (OUTPATIENT)
Dept: RADIOLOGY | Facility: HOSPITAL | Age: 59
Discharge: HOME | End: 2024-10-25
Payer: COMMERCIAL

## 2024-10-25 ENCOUNTER — OFFICE VISIT (OUTPATIENT)
Dept: PRIMARY CARE | Facility: CLINIC | Age: 59
End: 2024-10-25
Payer: COMMERCIAL

## 2024-10-25 ENCOUNTER — TELEPHONE (OUTPATIENT)
Dept: SURGERY | Facility: CLINIC | Age: 59
End: 2024-10-25
Payer: COMMERCIAL

## 2024-10-25 VITALS
BODY MASS INDEX: 33.2 KG/M2 | SYSTOLIC BLOOD PRESSURE: 124 MMHG | OXYGEN SATURATION: 99 % | DIASTOLIC BLOOD PRESSURE: 66 MMHG | WEIGHT: 164.4 LBS | HEART RATE: 72 BPM

## 2024-10-25 DIAGNOSIS — M54.50 ACUTE LEFT-SIDED LOW BACK PAIN WITHOUT SCIATICA: ICD-10-CM

## 2024-10-25 DIAGNOSIS — M54.50 ACUTE LEFT-SIDED LOW BACK PAIN WITHOUT SCIATICA: Primary | ICD-10-CM

## 2024-10-25 LAB
POC APPEARANCE, URINE: ABNORMAL
POC BILIRUBIN, URINE: NEGATIVE
POC BLOOD, URINE: NEGATIVE
POC COLOR, URINE: ABNORMAL
POC GLUCOSE, URINE: NEGATIVE MG/DL
POC KETONES, URINE: NEGATIVE MG/DL
POC LEUKOCYTES, URINE: ABNORMAL
POC NITRITE,URINE: NEGATIVE
POC PH, URINE: 6.5 PH
POC PROTEIN, URINE: NEGATIVE MG/DL
POC SPECIFIC GRAVITY, URINE: 1.02
POC UROBILINOGEN, URINE: 0.2 EU/DL

## 2024-10-25 PROCEDURE — 99213 OFFICE O/P EST LOW 20 MIN: CPT | Performed by: FAMILY MEDICINE

## 2024-10-25 PROCEDURE — 81003 URINALYSIS AUTO W/O SCOPE: CPT | Performed by: FAMILY MEDICINE

## 2024-10-25 PROCEDURE — 74018 RADEX ABDOMEN 1 VIEW: CPT

## 2024-10-25 PROCEDURE — 1036F TOBACCO NON-USER: CPT | Performed by: FAMILY MEDICINE

## 2024-10-25 ASSESSMENT — PATIENT HEALTH QUESTIONNAIRE - PHQ9
1. LITTLE INTEREST OR PLEASURE IN DOING THINGS: NOT AT ALL
SUM OF ALL RESPONSES TO PHQ9 QUESTIONS 1 AND 2: 0
2. FEELING DOWN, DEPRESSED OR HOPELESS: NOT AT ALL

## 2024-10-25 ASSESSMENT — PAIN SCALES - GENERAL: PAINLEVEL_OUTOF10: 5

## 2024-10-25 NOTE — TELEPHONE ENCOUNTER
Patient is S/P Colonoscopy 10/22/2024 with Dr. August.  Patient called office this morning concerned about left lower back ache that she has been experiencing.  Patient reports that she has noticed the back ache is making it hard to sit or lay down.  Patient is also reporting gas and bloating in her abdomen, patient reports not having a bowel movement since her bowel prep on Monday 10/21/2024.   Patient denies any fever, nausea, vomiting, any pain or diarrhea.  Patient reports that she is eating and drinking without any concern.    Spoke with Dr. August about patient's concerns and symptoms.  Per Dr. August, no biopsy was taken during patient's colonoscopy and therefor does not think there is any concern for perforation or a tear at this time.  Per Dr. August, it is normal to not have a bowel movement for several days after the Colonoscopy bowel prep.  Dr. August recommends that patient follow up with her PCP for the left lower back ache.      Patient notified of Dr. Broussard's recommendation and verbalized understanding.  Patient stated that she would call her PCP to follow up and denies any other questions or concerns at this time.

## 2024-10-25 NOTE — PROGRESS NOTES
Subjective   Patient ID: Risa Alvarenga is a 59 y.o. female who presents for Back Pain.    HPI   Pt comes in c.o of lower back pain on the left side.  She had a colonoscopy on 10/22/24 but no biopsies done.  Told to f/u with pcp.  Pt also w/o BM since bowel prep on Monday but told normal not to have a BM for several days after c scope    Past Medical History:   Diagnosis Date    Personal history of other infectious and parasitic diseases     History of chicken pox    Plantar fascial fibromatosis     Plantar fasciitis    Unspecified injury of unspecified foot, initial encounter     Injury of foot, unspecified laterality, initial encounter     Current Outpatient Medications   Medication Sig Dispense Refill    folic acid/multivit,iron, (ONE DAILY FOR WOMEN ORAL) if needed.       No current facility-administered medications for this visit.       Review of Systems see hpi    Objective   /66   Pulse 72   Wt 74.6 kg (164 lb 6.4 oz)   SpO2 99%   BMI 33.20 kg/m²     Physical Exam  Vitals reviewed.   Constitutional:       Appearance: Normal appearance.   Cardiovascular:      Rate and Rhythm: Normal rate and regular rhythm.      Heart sounds: Normal heart sounds.   Pulmonary:      Effort: Pulmonary effort is normal.      Breath sounds: Normal breath sounds.   Abdominal:      General: Abdomen is flat. There is no distension.      Palpations: Abdomen is soft.      Tenderness: There is no abdominal tenderness. There is no right CVA tenderness, left CVA tenderness, guarding or rebound.   Musculoskeletal:      Lumbar back: Normal. No tenderness or bony tenderness. Negative right straight leg raise test and negative left straight leg raise test.   Neurological:      Mental Status: She is alert.         Assessment/Plan   Assessment & Plan  Acute left-sided low back pain without sciatica    Orders:    POCT UA Automated manually resulted    XR abdomen 2 views supine and erect or decub; Future

## 2024-10-29 ENCOUNTER — APPOINTMENT (OUTPATIENT)
Dept: PRIMARY CARE | Facility: CLINIC | Age: 59
End: 2024-10-29
Payer: COMMERCIAL

## 2024-11-05 ENCOUNTER — APPOINTMENT (OUTPATIENT)
Dept: SURGERY | Facility: CLINIC | Age: 59
End: 2024-11-05
Payer: COMMERCIAL

## 2025-01-08 ENCOUNTER — APPOINTMENT (OUTPATIENT)
Dept: OBSTETRICS AND GYNECOLOGY | Facility: CLINIC | Age: 60
End: 2025-01-08
Payer: COMMERCIAL

## 2025-01-08 VITALS
SYSTOLIC BLOOD PRESSURE: 110 MMHG | WEIGHT: 164 LBS | BODY MASS INDEX: 33.06 KG/M2 | HEIGHT: 59 IN | DIASTOLIC BLOOD PRESSURE: 80 MMHG

## 2025-01-08 DIAGNOSIS — Z12.31 VISIT FOR SCREENING MAMMOGRAM: ICD-10-CM

## 2025-01-08 DIAGNOSIS — Z01.419 WELL WOMAN EXAM WITH ROUTINE GYNECOLOGICAL EXAM: Primary | ICD-10-CM

## 2025-01-08 PROCEDURE — 99396 PREV VISIT EST AGE 40-64: CPT | Performed by: NURSE PRACTITIONER

## 2025-01-08 PROCEDURE — 3008F BODY MASS INDEX DOCD: CPT | Performed by: NURSE PRACTITIONER

## 2025-01-08 PROCEDURE — 1036F TOBACCO NON-USER: CPT | Performed by: NURSE PRACTITIONER

## 2025-01-08 ASSESSMENT — ENCOUNTER SYMPTOMS
EYES NEGATIVE: 1
HEMATOLOGIC/LYMPHATIC NEGATIVE: 1
CONSTITUTIONAL NEGATIVE: 1
ALLERGIC/IMMUNOLOGIC NEGATIVE: 1
MUSCULOSKELETAL NEGATIVE: 1
CARDIOVASCULAR NEGATIVE: 1
PSYCHIATRIC NEGATIVE: 1
GASTROINTESTINAL NEGATIVE: 1
NEUROLOGICAL NEGATIVE: 1
ENDOCRINE NEGATIVE: 1
RESPIRATORY NEGATIVE: 1

## 2025-01-08 NOTE — PROGRESS NOTES
"Chief Complaint    Annual Exam        HPI    ANNUAL - NO CONCERNS    PAP - 2023 ASCUS /HPV NEG  MAMMO - 2024  DEXA - NEVER  COLON - 10/22/2024    CHAPERONE - DECLINED   Last edited by Corie Mahmood MA on 2025  2:41 PM.         59 y.o.  female presents for annual well woman exam.   Hx. of endometrial ablation in ~.  Denies menopausal symptoms. She is not on hormone replacement therapy.  Denies any post-menopausal vaginal bleeding.  She is , currently sexually active. Denies dyspareunia.    She denies any breast changes. H/O benign fatty lymph nodes, right axilla.  Pap hx. normal. Last pap: 2023 ASCUS and negative HPV.   Denies abnormal vaginal discharge, itching, odor, or dryness.   No urinary or bowel concerns. H/O bladder suspension ~20 years ago. Colonoscopy: 10/2024 which was normal and is due to return at 5 years.   She is a non-smoker.   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 Has 1 grand baby.     /80   Ht 1.499 m (4' 11\")   Wt 74.4 kg (164 lb)   BMI 33.12 kg/m²      Current Outpatient Medications   Medication Instructions    folic acid/multivit,iron, (ONE DAILY FOR WOMEN ORAL) if needed.        Review of Systems   Constitutional: Negative.    HENT: Negative.     Eyes: Negative.    Respiratory: Negative.     Cardiovascular: Negative.    Gastrointestinal: Negative.    Endocrine: Negative.    Genitourinary: Negative.    Musculoskeletal: Negative.    Skin: Negative.    Allergic/Immunologic: Negative.    Neurological: Negative.    Hematological: Negative.    Psychiatric/Behavioral: Negative.     All other systems reviewed and are negative.       Physical Exam  Constitutional:       Appearance: Normal appearance.   HENT:      Head: Normocephalic.      Nose: Nose normal.   Cardiovascular:      Rate and Rhythm: Normal rate and regular rhythm. "   Pulmonary:      Effort: Pulmonary effort is normal.      Breath sounds: Normal breath sounds.   Chest:   Breasts:     Right: Normal.      Left: Normal.   Abdominal:      General: Abdomen is flat.      Palpations: Abdomen is soft.   Genitourinary:     General: Normal vulva.      Vagina: Normal.      Cervix: Normal.      Uterus: Normal.       Adnexa: Right adnexa normal and left adnexa normal.      Rectum: Normal.      Comments: Pap not collected today  Musculoskeletal:         General: Normal range of motion.      Cervical back: Normal range of motion and neck supple.   Skin:     General: Skin is warm and dry.   Neurological:      Mental Status: She is alert.   Psychiatric:         Mood and Affect: Mood normal.         Behavior: Behavior normal.          Assessment/Plan:  1. Well woman exam with routine gynecological exam (Primary)  -Pap test not collected today. Last pap was in 12/2023 and was ASCUS and negative HPV. Guidelines discussed.   -Mammogram ordered. Self breast awareness exams reviewed.  -Colonoscopy is up to date.  -Discussed adequate calcium (1200 mg) and vitamin d (1,000 IUs) daily. Regular weight bearing exercise.   -Advised yearly well woman exams.   -Follow up sooner if needed.     2. Visit for screening mammogram  - BI mammo bilateral screening tomosynthesis; Future